# Patient Record
Sex: MALE | Race: OTHER | NOT HISPANIC OR LATINO
[De-identification: names, ages, dates, MRNs, and addresses within clinical notes are randomized per-mention and may not be internally consistent; named-entity substitution may affect disease eponyms.]

---

## 2023-04-11 PROBLEM — Z00.00 ENCOUNTER FOR PREVENTIVE HEALTH EXAMINATION: Status: ACTIVE | Noted: 2023-04-11

## 2023-04-19 ENCOUNTER — APPOINTMENT (OUTPATIENT)
Dept: CARDIOLOGY | Facility: CLINIC | Age: 43
End: 2023-04-19
Payer: COMMERCIAL

## 2023-04-19 ENCOUNTER — NON-APPOINTMENT (OUTPATIENT)
Age: 43
End: 2023-04-19

## 2023-04-19 VITALS
SYSTOLIC BLOOD PRESSURE: 116 MMHG | OXYGEN SATURATION: 97 % | HEIGHT: 68 IN | DIASTOLIC BLOOD PRESSURE: 75 MMHG | BODY MASS INDEX: 28.79 KG/M2 | WEIGHT: 190 LBS | HEART RATE: 35 BPM

## 2023-04-19 DIAGNOSIS — Z82.49 FAMILY HISTORY OF ISCHEMIC HEART DISEASE AND OTHER DISEASES OF THE CIRCULATORY SYSTEM: ICD-10-CM

## 2023-04-19 DIAGNOSIS — Z87.891 PERSONAL HISTORY OF NICOTINE DEPENDENCE: ICD-10-CM

## 2023-04-19 DIAGNOSIS — Z87.19 PERSONAL HISTORY OF OTHER DISEASES OF THE DIGESTIVE SYSTEM: ICD-10-CM

## 2023-04-19 DIAGNOSIS — F41.8 OTHER SPECIFIED ANXIETY DISORDERS: ICD-10-CM

## 2023-04-19 DIAGNOSIS — Z86.79 PERSONAL HISTORY OF OTHER DISEASES OF THE CIRCULATORY SYSTEM: ICD-10-CM

## 2023-04-19 PROCEDURE — 99495 TRANSJ CARE MGMT MOD F2F 14D: CPT

## 2023-04-19 PROCEDURE — 93000 ELECTROCARDIOGRAM COMPLETE: CPT

## 2023-04-19 PROCEDURE — 99204 OFFICE O/P NEW MOD 45 MIN: CPT | Mod: 25

## 2023-04-20 ENCOUNTER — NON-APPOINTMENT (OUTPATIENT)
Age: 43
End: 2023-04-20

## 2023-04-20 PROBLEM — Z87.891 FORMER SMOKER: Status: ACTIVE | Noted: 2023-04-19

## 2023-04-20 PROBLEM — Z87.19 HISTORY OF GASTROESOPHAGEAL REFLUX (GERD): Status: RESOLVED | Noted: 2023-04-20 | Resolved: 2023-04-20

## 2023-04-20 PROBLEM — Z87.891 HISTORY OF NICOTINE DEPENDENCE: Status: RESOLVED | Noted: 2023-04-20 | Resolved: 2023-04-20

## 2023-04-20 PROBLEM — F41.8 ANXIOUS DEPRESSION: Status: RESOLVED | Noted: 2023-04-20 | Resolved: 2023-04-20

## 2023-04-20 PROBLEM — Z86.79 HISTORY OF HYPERTENSION: Status: RESOLVED | Noted: 2023-04-20 | Resolved: 2023-04-20

## 2023-04-20 RX ORDER — RISPERIDONE 2 MG/1
2 TABLET, ORALLY DISINTEGRATING ORAL
Refills: 0 | Status: ACTIVE | COMMUNITY

## 2023-04-20 RX ORDER — ALBUTEROL SULFATE 90 UG/1
108 (90 BASE) INHALANT RESPIRATORY (INHALATION)
Refills: 0 | Status: ACTIVE | COMMUNITY

## 2023-04-20 RX ORDER — OMEPRAZOLE MAGNESIUM 20 MG/1
20 CAPSULE, DELAYED RELEASE ORAL
Refills: 0 | Status: ACTIVE | COMMUNITY

## 2023-04-20 NOTE — DISCUSSION/SUMMARY
[FreeTextEntry1] : Shortness of breath/chest pain\par The working diagnosis is dyspnea secondary to obesity deconditioning  and smoking with musculoskeletal chest pain.  The history is compelling for this diagnosis.  The history physical findings and 4/23 chest x-ray do not support a diagnosis of pulmonary venous congestion/heart failure.  The absence of electrocardiographic findings of ischemia and normal troponin levels argue against myocardial ischemia/atherosclerotic heart disease.  The normal  4/23 CTA of the chest effectively ruled out pulmonary emboli.  Noninvasive cardiac studies would be helpful for further evaluation.\par \par I have recommended the following\par a.  Continued complete cessation of smoking.\par b.  Low-salt low-fat low-cholesterol heart healthy diet.  Regular aerobic exercise and weight loss\par c.  Echocardiogram\par d.  Exercise treadmill ECG study\par \par \par \par Hypertension\par Hypertension appears to be controlled with the present medical regimen.  The dose of lisinopril may be increased if required to maintain optimal levels.  Nonpharmacological therapy, specifically diet exercise and weight loss are emphasized as major aspects of treatment.\par \par I have recommended the following\par a.  Low-fat low-cholesterol low-salt heart healthy diet.  Regular aerobic exercise and weight loss\par b.  Continue the present medical regimen\par c.  Increase lisinopril dose if required to maintain optimal levels as discussed above\par d.  Home blood pressure monitoring\par \par \par \par Hyperlipidemia\par Hyperlipidemia represents a risk factor for atherosclerotic heart disease.  The target LDL level for primary prevention is about 100.  In 4/23 the serum cholesterol level was 225  HDL 29 and triglycerides 320.  The dose of atorvastatin may be increased if required to obtain optimal levels.  Nonpharmacological therapy, specifically diet exercise and weight loss are emphasized as major aspects of treatment.\par \par I have recommended the following\par a.  Low-salt low-fat low-cholesterol heart healthy diet.  Regular aerobic exercise and weight loss.\par b.  Target LDL level to about 100 as discussed above\par c.  Continue the present medical regimen\par d.  Increase atorvastatin dose if required to obtain optimal levels\par e.  Laboratory studies including fasting lipid profile liver function tests and CPK levels 6 to 8 weeks following initiation of treatment\par f.   Screening exercise treadmill ECG study.\par \par \par Nicotine dependence\par Tyler smoked 1 to 2 packs of cigarettes daily stopping following the 4/23 hospitalization for chest pain and shortness of breath.  He presently is using a nicotine patch.  Continue complete cessation of smoking is advised.\par \par \par Obesity\par Obesity exacerbates Tyler's cardiovascular issues.  Today Mr. Skaggs is 5 feet 8 inches tall and weighs 190 pounds.  Diet exercise and weight loss are advised.\par \par \par \par \par The diagnosis, prognosis, risks, options and alternatives were explained at length to the patient.  All questions were answered.  Issues discussed included hypertension hyperlipidemia nicotine dependence alcohol use atherosclerotic heart disease noninvasive cardiac testing obesity diet exercise and weight loss.

## 2023-04-20 NOTE — REVIEW OF SYSTEMS
[Feeling Fatigued] : feeling fatigued [Chest Discomfort] : chest discomfort [Negative] : Heme/Lymph [FreeTextEntry5] : See history of present illness

## 2023-04-20 NOTE — PHYSICAL EXAM
[Normal Conjunctiva] : normal conjunctiva [Normal S1, S2] : normal S1, S2 [Clear Lung Fields] : clear lung fields [Soft] : abdomen soft [Normal Bowel Sounds] : normal bowel sounds [Normal Gait] : normal gait [No Rash] : no rash [No Focal Deficits] : no focal deficits [de-identified] : Appears overweight  in no distress lying flat [de-identified] : Normocephalic [de-identified] : No neck vein distention.  No carotid bruit [de-identified] : No murmur.  No gallop.  No diastolic sounds. [de-identified] : Full range of motion [de-identified] : No peripheral edema.  Dorsalis pedis pulse +2 bilaterally.  Feet warm and well-perfused.  No ulcerations. [de-identified] : Melindaos [de-identified] : Pleasant

## 2023-04-20 NOTE — HISTORY OF PRESENT ILLNESS
[FreeTextEntry1] : 42-year-old man\par Cardiology consultation requested because of chest pain/hypertension and dyspnea\par \par Mr. Skaggs has no known heart disease.  There is no history of myocardial infarction angina or congestive heart failure.\par \margret Scott was hospitalized 4/6/23-4/08/23 because of chest pain and shortness of breath.  There was no evidence of a myocardial infarction.  Troponin levels and electrocardiogram were unremarkable.  He was found to have significant hypertension and hyperlipidemia and was treated with antihypertensive agents and atorvastatin.  He was advised to have a cardiovascular evaluation.  (See hospital records)\par \margret Scott smoked as much is 2 packs of cigarettes daily stopping following the hospitalization.  He now uses a nicotine patch.  He had been drinking 5-6 beers daily also stopping following hospitalization.\par His mother and grandmother had myocardial infarctions.  His mother has hypertension\par \par Chest pain and dyspnea have improved following hospitalization and blood pressure levels have normalized.  However his heart rate has consistently been about 100/minute.\par \par There is no history of diabetes.\par \margret Scott presents today for cardiovascular evaluation.\par

## 2023-04-21 ENCOUNTER — APPOINTMENT (OUTPATIENT)
Dept: CARDIOLOGY | Facility: CLINIC | Age: 43
End: 2023-04-21
Payer: COMMERCIAL

## 2023-04-21 PROCEDURE — 93015 CV STRESS TEST SUPVJ I&R: CPT

## 2023-05-08 ENCOUNTER — APPOINTMENT (OUTPATIENT)
Dept: CARDIOLOGY | Facility: CLINIC | Age: 43
End: 2023-05-08

## 2023-05-10 ENCOUNTER — NON-APPOINTMENT (OUTPATIENT)
Age: 43
End: 2023-05-10

## 2023-05-10 ENCOUNTER — APPOINTMENT (OUTPATIENT)
Dept: PULMONOLOGY | Facility: CLINIC | Age: 43
End: 2023-05-10
Payer: COMMERCIAL

## 2023-05-10 VITALS
OXYGEN SATURATION: 99 % | TEMPERATURE: 96.4 F | HEIGHT: 68 IN | DIASTOLIC BLOOD PRESSURE: 80 MMHG | BODY MASS INDEX: 28.79 KG/M2 | WEIGHT: 190 LBS | SYSTOLIC BLOOD PRESSURE: 120 MMHG | HEART RATE: 146 BPM

## 2023-05-10 PROCEDURE — 94010 BREATHING CAPACITY TEST: CPT

## 2023-05-10 PROCEDURE — 99204 OFFICE O/P NEW MOD 45 MIN: CPT | Mod: 25

## 2023-05-10 NOTE — ASSESSMENT
[FreeTextEntry1] :   Patient with bullous emphysema on the CAT scan with normal spirometry.  Patient stopped smoking 1 month ago.  There is also a 1 cm right lower lobe nodule on CAT scan.  Patient requires a repeat CAT scan of the chest in 3 months and follow-up with me at that time.  Patient has been congratulated on smoking cessation.  There is no indication for bronchodilator therapy at this time.

## 2023-05-10 NOTE — HISTORY OF PRESENT ILLNESS
[FreeTextEntry1] : Follow-up recent hospitalization. [de-identified] :   Patient is a 42-year-old male smoker of 1-1/2 packs/day for 28 years.  He quit 1 month ago.  He was hospitalized from April 6 April 8 with chest pain, mild shortness of breath and palpitations.  Cardiac work-up was negative.  He underwent a CTA of the chest which revealed numerous bullous disease especially in the upper lobes.  There is also on my review a 1 cm right lower lobe nodule.  He denies cough or sputum production fever, chills or wheezing.  He has no shortness of breath and yesterday was able to walk 3 miles without difficulty.  He also stopped drinking 5 beers a day about a month ago.  Past medical history hypertension.  Medications were reviewed.  Current O2 sat 97 on room air pulse is 128 and regular.  His spirometry is completely normal with an FEV1 and FVC of 83% of predicted.

## 2023-05-10 NOTE — PHYSICAL EXAM
[No Acute Distress] : no acute distress [Well Nourished] : well nourished [Well Developed] : well developed [Well-Appearing] : well-appearing [Normal Sclera/Conjunctiva] : normal sclera/conjunctiva [PERRL] : pupils equal round and reactive to light [EOMI] : extraocular movements intact [Normal Outer Ear/Nose] : the outer ears and nose were normal in appearance [Normal Oropharynx] : the oropharynx was normal [No JVD] : no jugular venous distention [No Lymphadenopathy] : no lymphadenopathy [Supple] : supple [No Respiratory Distress] : no respiratory distress  [No Accessory Muscle Use] : no accessory muscle use [Clear to Auscultation] : lungs were clear to auscultation bilaterally [Normal Rate] : normal rate  [Regular Rhythm] : with a regular rhythm [Normal S1, S2] : normal S1 and S2 [No Murmur] : no murmur heard [No Carotid Bruits] : no carotid bruits [No Abdominal Bruit] : a ~M bruit was not heard ~T in the abdomen [No Varicosities] : no varicosities [Pedal Pulses Present] : the pedal pulses are present [No Edema] : there was no peripheral edema [No Palpable Aorta] : no palpable aorta [No Extremity Clubbing/Cyanosis] : no extremity clubbing/cyanosis [Soft] : abdomen soft [Non Tender] : non-tender [Non-distended] : non-distended [No Masses] : no abdominal mass palpated [No HSM] : no HSM [Normal Bowel Sounds] : normal bowel sounds [Normal Posterior Cervical Nodes] : no posterior cervical lymphadenopathy [Normal Anterior Cervical Nodes] : no anterior cervical lymphadenopathy [No Focal Deficits] : no focal deficits [Normal Gait] : normal gait [Normal Affect] : the affect was normal [Normal Insight/Judgement] : insight and judgment were intact [de-identified] : r neck lipoma

## 2023-05-31 ENCOUNTER — RESULT CHARGE (OUTPATIENT)
Age: 43
End: 2023-05-31

## 2023-06-01 ENCOUNTER — NON-APPOINTMENT (OUTPATIENT)
Age: 43
End: 2023-06-01

## 2023-06-01 DIAGNOSIS — I49.9 CARDIAC ARRHYTHMIA, UNSPECIFIED: ICD-10-CM

## 2023-06-02 ENCOUNTER — NON-APPOINTMENT (OUTPATIENT)
Age: 43
End: 2023-06-02

## 2023-06-02 ENCOUNTER — APPOINTMENT (OUTPATIENT)
Dept: CARDIOLOGY | Facility: CLINIC | Age: 43
End: 2023-06-02
Payer: COMMERCIAL

## 2023-06-02 PROCEDURE — 93000 ELECTROCARDIOGRAM COMPLETE: CPT | Mod: 59

## 2023-06-02 PROCEDURE — 93246 EXT ECG>7D<15D RECORDING: CPT

## 2023-06-16 PROCEDURE — 93248 EXT ECG>7D<15D REV&INTERPJ: CPT

## 2023-06-26 ENCOUNTER — NON-APPOINTMENT (OUTPATIENT)
Age: 43
End: 2023-06-26

## 2023-06-26 ENCOUNTER — APPOINTMENT (OUTPATIENT)
Dept: CARDIOLOGY | Facility: CLINIC | Age: 43
End: 2023-06-26
Payer: COMMERCIAL

## 2023-06-26 PROCEDURE — 93306 TTE W/DOPPLER COMPLETE: CPT

## 2023-06-30 ENCOUNTER — TRANSCRIPTION ENCOUNTER (OUTPATIENT)
Age: 43
End: 2023-06-30

## 2023-07-04 ENCOUNTER — NON-APPOINTMENT (OUTPATIENT)
Age: 43
End: 2023-07-04

## 2023-07-05 ENCOUNTER — NON-APPOINTMENT (OUTPATIENT)
Age: 43
End: 2023-07-05

## 2023-07-07 ENCOUNTER — NON-APPOINTMENT (OUTPATIENT)
Age: 43
End: 2023-07-07

## 2023-07-07 ENCOUNTER — APPOINTMENT (OUTPATIENT)
Dept: CARDIOLOGY | Facility: CLINIC | Age: 43
End: 2023-07-07
Payer: COMMERCIAL

## 2023-07-07 VITALS
BODY MASS INDEX: 32.28 KG/M2 | OXYGEN SATURATION: 96 % | WEIGHT: 213 LBS | HEART RATE: 108 BPM | HEIGHT: 68 IN | SYSTOLIC BLOOD PRESSURE: 144 MMHG | DIASTOLIC BLOOD PRESSURE: 82 MMHG

## 2023-07-07 DIAGNOSIS — Z87.09 PERSONAL HISTORY OF OTHER DISEASES OF THE RESPIRATORY SYSTEM: ICD-10-CM

## 2023-07-07 DIAGNOSIS — Z86.39 PERSONAL HISTORY OF OTHER ENDOCRINE, NUTRITIONAL AND METABOLIC DISEASE: ICD-10-CM

## 2023-07-07 PROCEDURE — 93246 EXT ECG>7D<15D RECORDING: CPT

## 2023-07-07 PROCEDURE — 99214 OFFICE O/P EST MOD 30 MIN: CPT | Mod: 25

## 2023-07-07 PROCEDURE — 99495 TRANSJ CARE MGMT MOD F2F 14D: CPT

## 2023-07-07 PROCEDURE — 93000 ELECTROCARDIOGRAM COMPLETE: CPT | Mod: 59

## 2023-07-09 PROBLEM — Z87.09 HISTORY OF CHRONIC OBSTRUCTIVE LUNG DISEASE: Status: RESOLVED | Noted: 2023-07-09 | Resolved: 2023-07-09

## 2023-07-09 PROBLEM — Z86.39 HISTORY OF OBESITY: Status: RESOLVED | Noted: 2023-07-09 | Resolved: 2023-07-09

## 2023-07-09 RX ORDER — AMLODIPINE BESYLATE 5 MG/1
TABLET ORAL
Refills: 0 | Status: ACTIVE | COMMUNITY

## 2023-07-09 RX ORDER — METOPROLOL TARTRATE 75 MG/1
TABLET, FILM COATED ORAL
Refills: 0 | Status: ACTIVE | COMMUNITY

## 2023-07-09 NOTE — DISCUSSION/SUMMARY
[FreeTextEntry1] : Paroxysmal supraventricular tachycardia/palpitations\par Tyler was hospitalized    6/29- 6/30/23 for palpitations.  He was found to have supraventricular tachycardia 190/minute.  The administration of beta-blockers metoprolol tartrate 25 mg twice daily help to reduce heart rate.  Initially not tolerated with symptoms of dizziness he now takes metoprolol without any adverse effects.  \par \par Comment\par The working diagnosis is paroxysmal supraventricular tachycardia (190/minute) secondary to hypertensive??  atherosclerotic heart disease exacerbated by obesity.  There has been a favorable symptomatic response to the administration of low-dose beta-blockers (metoprolol tartrate 25 mg twice daily)\par \par \par I have recommended the following\par a.  Continue the present medical regimen\par b.  Zio patch/mobile telemetry study while taking the present medical regimen\par c.  Await exercise stress test\par d.  Decrease alcohol ingestion\par e.  Electrophysiological studies dependent on the patient's clinical course\par \par \par \par \par \par Shortness of breath/chest pain\par The working diagnosis is dyspnea secondary to obesity deconditioning  and smoking with musculoskeletal chest pain.  The history is compelling for this diagnosis.  The history physical findings and   6/23  chest x-ray do not support a diagnosis of pulmonary venous congestion/heart failure.  The absence of electrocardiographic findings of ischemia and normal troponin levels argue against myocardial ischemia/atherosclerotic heart disease.  The normal  4/23 CTA of the chest effectively ruled out pulmonary emboli.  Noninvasive cardiac studies would be helpful for further evaluation.\par \par I have recommended the following\par a.  Continued complete cessation of smoking.\par b.  Low-salt low-fat low-cholesterol heart healthy diet.  Regular aerobic exercise and weight loss\par c.  Exercise treadmill ECG study\par \par \par \par Hypertension\par Hypertension appears to be controlled with the present medical regimen.  Prior treatment with lisinopril 10 mg/day has been deleted and replaced by metoprolol tartrate 25 mg twice daily.  In the setting of atrial arrhythmias beta-blocker therapy is attractive.  The dose of metoprolol may be increased and/or lisinopril reinstituted if required to maintain optimal levels.   Nonpharmacological therapy, specifically diet exercise and weight loss are emphasized as major aspects of treatment.\par \par I have recommended the following\par a.  Low-fat low-cholesterol low-salt heart healthy diet.  Regular aerobic exercise and weight loss\par b.  Continue the present medical regimen\par c.  Increase metoprolol dose and/or reinstitution of lisinopril   if required to maintain optimal levels as discussed above\par d.  Home blood pressure monitoring\par \par \par \par Hyperlipidemia\par Hyperlipidemia represents a risk factor for atherosclerotic heart disease.  The target LDL level for primary prevention is about 100.  In 4/23 the serum cholesterol level was 225  HDL 29 and triglycerides 320.  Prior treatment with atorvastatin has been discontinued .  Nonpharmacological therapy, specifically diet exercise and weight loss are emphasized as major aspects of treatment.\par \par I have recommended the following\par a.  Low-salt low-fat low-cholesterol heart healthy diet.  Regular aerobic exercise and weight loss.\par b.  Target LDL level to about 100 as discussed above\par c.  Continue the present medical regimen\par d.  Reinstitution of atorvastatin if required to obtain optimal levels\par f.   Screening exercise treadmill ECG study.\par \par \par Nicotine dependence\par Tyler smoked 1 to 2 packs of cigarettes daily stopping following the 4/23 hospitalization for chest pain and shortness of breath.  He presently is using a nicotine patch.  Continue complete cessation of smoking is advised.\par \par \par \par \par Obesity\par Obesity exacerbates Tyler's cardiovascular issues.  Today Mr. Skaggs is 5 feet 8 inches tall and weighs 213  pounds.  Diet exercise and weight loss are advised.\par \par \par \par \par The diagnosis, prognosis, risks, options and alternatives were explained at length to the patient.  All questions were answered.  Issues discussed included paroxysmal supraventricular tachycardia hypertension hyperlipidemia nicotine dependence alcohol use atherosclerotic heart disease noninvasive cardiac testing obesity diet exercise and weight loss.\par \par \par Counseling and/or coordination of care\par Time was a significant factor for this patient encounter.  Total time spent with the patient and family was 30 minutes.  Greater than 50% of the time was devoted to counseling and/or coordination of care.

## 2023-07-09 NOTE — PHYSICAL EXAM
[Normal Conjunctiva] : normal conjunctiva [Normal S1, S2] : normal S1, S2 [Clear Lung Fields] : clear lung fields [Soft] : abdomen soft [Normal Bowel Sounds] : normal bowel sounds [No Rash] : no rash [Normal Gait] : normal gait [No Focal Deficits] : no focal deficits [de-identified] : Appears overweight  in no distress lying flat [de-identified] : Normocephalic [de-identified] : No neck vein distention.  No carotid bruit [de-identified] : No murmur.  No gallop.  No diastolic sounds. [de-identified] : Full range of motion [de-identified] : No peripheral edema.  Dorsalis pedis pulse +2 bilaterally.  Feet warm and well-perfused.  No ulcerations. [de-identified] : Melindaos [de-identified] : Pleasant

## 2023-07-09 NOTE — HISTORY OF PRESENT ILLNESS
[FreeTextEntry1] : 42-year-old man\par Routine follow-up out of hospital\par \par Tyler was hospitalized   ( 6/29- 6/30/23 for palpitations.  He was found to have supraventricular tachycardia 190/minute.  The administration of beta-blockers metoprolol tartrate 25 mg twice daily help to reduce heart rate.  Initially not tolerated with symptoms of dizziness he now takes metoprolol without any adverse effects.\par Dyspnea on exertion is not progressive or severe.  He has had random chest discomfort at rest without associated diaphoresis nausea or dyspnea.\par

## 2023-07-21 PROCEDURE — 93248 EXT ECG>7D<15D REV&INTERPJ: CPT

## 2023-08-04 RX ORDER — METOPROLOL TARTRATE 25 MG/1
25 TABLET, FILM COATED ORAL TWICE DAILY
Qty: 180 | Refills: 3 | Status: ACTIVE | COMMUNITY
Start: 2023-08-04

## 2023-08-05 DIAGNOSIS — Z86.79 PERSONAL HISTORY OF OTHER DISEASES OF THE CIRCULATORY SYSTEM: ICD-10-CM

## 2023-08-11 ENCOUNTER — RESULT REVIEW (OUTPATIENT)
Age: 43
End: 2023-08-11

## 2023-08-21 ENCOUNTER — APPOINTMENT (OUTPATIENT)
Dept: PULMONOLOGY | Facility: CLINIC | Age: 43
End: 2023-08-21
Payer: COMMERCIAL

## 2023-08-21 VITALS
BODY MASS INDEX: 32.28 KG/M2 | OXYGEN SATURATION: 98 % | SYSTOLIC BLOOD PRESSURE: 130 MMHG | HEIGHT: 68 IN | HEART RATE: 105 BPM | DIASTOLIC BLOOD PRESSURE: 90 MMHG | WEIGHT: 213 LBS

## 2023-08-21 DIAGNOSIS — J43.9 EMPHYSEMA, UNSPECIFIED: ICD-10-CM

## 2023-08-21 DIAGNOSIS — R91.1 SOLITARY PULMONARY NODULE: ICD-10-CM

## 2023-08-21 PROCEDURE — 99213 OFFICE O/P EST LOW 20 MIN: CPT

## 2023-08-21 NOTE — REASON FOR VISIT
[Follow-Up] : a follow-up visit [COPD] : COPD [Pulmonary Nodules] : pulmonary nodules [Nicotine Dependence] : nicotine dependence

## 2023-08-21 NOTE — HISTORY OF PRESENT ILLNESS
[TextBox_4] : Patient returns for follow-up on repeat CAT scan for follow-up of an 8 mm right lower lobe nodule.  Patient with no bullous emphysema on CAT scan.  He continues not to smoke.  He feels generally well but is eating for the past 4 months.  His breathing is fine with no cough, shortness of breath or wheezing.  His CAT scan was reviewed by me personally on 8–11.  The 8 mm nodule is unchanged compared with 4–6 2023.  Bullous emphysematous changes are unchanged.

## 2023-08-21 NOTE — ASSESSMENT
[FreeTextEntry1] : 8 mm pulmonary nodule in the right lower lobe is unchanged.  Patient remains without smoking over the past 4 months.  He is doing quite well with no respiratory complaints.  His CAT scan shows bullous emphysematous changes.  He is to continue off bronchodilators and monitor respiratory status.  He will have a repeat CAT scan of the chest in 6 months for follow-up with the 8 mm right lower lobe nodule.

## 2023-08-22 ENCOUNTER — APPOINTMENT (OUTPATIENT)
Dept: CARDIOLOGY | Facility: CLINIC | Age: 43
End: 2023-08-22

## 2023-08-29 ENCOUNTER — APPOINTMENT (OUTPATIENT)
Dept: CARDIOLOGY | Facility: CLINIC | Age: 43
End: 2023-08-29
Payer: COMMERCIAL

## 2023-08-29 VITALS
BODY MASS INDEX: 32.28 KG/M2 | OXYGEN SATURATION: 98 % | HEIGHT: 68 IN | SYSTOLIC BLOOD PRESSURE: 147 MMHG | DIASTOLIC BLOOD PRESSURE: 84 MMHG | HEART RATE: 90 BPM | WEIGHT: 213 LBS

## 2023-08-29 DIAGNOSIS — R06.02 SHORTNESS OF BREATH: ICD-10-CM

## 2023-08-29 DIAGNOSIS — E66.3 OVERWEIGHT: ICD-10-CM

## 2023-08-29 DIAGNOSIS — R00.2 PALPITATIONS: ICD-10-CM

## 2023-08-29 PROCEDURE — 99214 OFFICE O/P EST MOD 30 MIN: CPT | Mod: 25

## 2023-08-29 PROCEDURE — 93000 ELECTROCARDIOGRAM COMPLETE: CPT | Mod: 59

## 2023-09-03 ENCOUNTER — NON-APPOINTMENT (OUTPATIENT)
Age: 43
End: 2023-09-03

## 2023-09-03 PROBLEM — E66.3 OVERWEIGHT: Status: ACTIVE | Noted: 2023-04-20

## 2023-09-03 PROBLEM — R00.2 PALPITATIONS: Status: ACTIVE | Noted: 2023-06-01

## 2023-09-03 PROBLEM — R06.02 SHORTNESS OF BREATH: Status: ACTIVE | Noted: 2023-04-19

## 2023-09-03 NOTE — DISCUSSION/SUMMARY
[FreeTextEntry1] : Paroxysmal supraventricular tachycardia/palpitations Tyler was hospitalized    6/29- 6/30/23 for palpitations.  He was found to have supraventricular tachycardia 190/minute.  The administration of beta-blockers metoprolol tartrate 25 mg twice daily helped  to reduce heart rate.  Initially not tolerated with symptoms of dizziness he now takes metoprolol without any adverse effects.    Comment The working diagnosis is paroxysmal supraventricular tachycardia (190/minute) secondary to hypertensive??  atherosclerotic heart disease exacerbated by obesity.  There has been a favorable symptomatic response to the administration of low-dose beta-blockers (metoprolol tartrate 25 mg twice daily)  A  7/23 2-week Zio patch mobile telemetry study revealed sinus rhythm /minute.  The average heart rate was 88/minute.  No arrhythmia was seen.  No symptoms were recorded.   I have recommended the following a.  Continue the present medical regimen b Continue the present medical regimen c     Electrophysiological studies dependent on the patient's clinical course      Shortness of breath/chest pain The working diagnosis is dyspnea secondary to obesity deconditioning  and smoking with musculoskeletal chest pain.  The history is compelling for this diagnosis.  The history physical findings and   6/23  chest x-ray do not support a diagnosis of pulmonary venous congestion/heart failure.  The absence of electrocardiographic findings of ischemia  normal troponin levels  and  normal 4/23 exercise treadmill ECG study third stage Venu protocol   argue against myocardial ischemia/atherosclerotic heart disease.  The normal  4/23 CTA of the chest effectively ruled out pulmonary emboli.    I have recommended the following a.  Continued complete cessation of smoking. b.  Low-salt low-fat low-cholesterol heart healthy diet.  Regular aerobic exercise and weight loss c. No further cardiac testing for this problem at this time     Hypertension Hypertension appears to be controlled with the present medical regimen.  Prior treatment with lisinopril 10 mg/day has been deleted and replaced by metoprolol tartrate 25 mg twice daily. and amlodipine  5  mg/day  In the setting of atrial arrhythmias beta-blocker therapy is attractive.  The dose of metoprolol may be increased and/or lisinopril reinstituted if required to maintain optimal levels.   Nonpharmacological therapy, specifically diet exercise and weight loss are emphasized as major aspects of treatment.  I have recommended the following a.  Low-fat low-cholesterol low-salt heart healthy diet.  Regular aerobic exercise and weight loss b.  Continue the present medical regimen c.  Increase metoprolol dose and/or reinstitution of lisinopril   if required to maintain optimal levels as discussed above d.  Home blood pressure monitoring    Hyperlipidemia Hyperlipidemia represents a risk factor for atherosclerotic heart disease.  The target LDL level for primary prevention is about 100.  In 4/23 the serum cholesterol level was 225  HDL 29 and triglycerides 320.  Prior treatment with atorvastatin has been discontinued .  Nonpharmacological therapy, specifically diet exercise and weight loss are emphasized as major aspects of treatment.  I have recommended the following a.  Low-salt low-fat low-cholesterol heart healthy diet.  Regular aerobic exercise and weight loss. b.  Target LDL level to about 100 as discussed above c.  Continue the present medical regimen d.   Reinstitution of HMG Co. a reductase inhibitor therapy dependent on the patient's clinical course and follow-up lipid levels.   Nicotine dependence Tyler smoked 1 to 2 packs of cigarettes daily stopping following the 4/23 hospitalization for chest pain and shortness of breath.  .  Continued  complete cessation of smoking is advised.     Obesity Obesity exacerbates Tyler's cardiovascular issues.  Today Mr. Skaggs is 5 feet 8 inches tall and weighs 213  pounds.  Diet exercise and weight loss are advised.     The diagnosis, prognosis, risks, options and alternatives were explained at length to the patient.  All questions were answered.  Issues discussed included paroxysmal supraventricular tachycardia hypertension hyperlipidemia nicotine dependence alcohol use atherosclerotic heart disease noninvasive cardiac testing obesity diet exercise and weight loss.   Counseling and/or coordination of care Time was a significant factor for this patient encounter.  Total time spent with the patient was 30 minutes.  Greater than 50% of the time was devoted to counseling and/or coordination of care.

## 2023-09-03 NOTE — HISTORY OF PRESENT ILLNESS
[FreeTextEntry1] : 43-year-old man Routine follow-up for paroxysmal supraventricular tachycardia palpitations nicotine dependence hyperlipidemia   "I feel much better."  Heart rates have decreased significantly.  No sustained palpitations.  No shortness of breath at rest.  No chest pain.  No syncope.

## 2023-09-03 NOTE — PHYSICAL EXAM
[Normal Conjunctiva] : normal conjunctiva [Normal S1, S2] : normal S1, S2 [Clear Lung Fields] : clear lung fields [Soft] : abdomen soft [Normal Bowel Sounds] : normal bowel sounds [Normal Gait] : normal gait [No Rash] : no rash [No Focal Deficits] : no focal deficits [de-identified] : Appears overweight  in no distress lying flat [de-identified] : Normocephalic [de-identified] : No neck vein distention.  No carotid bruit [de-identified] : No murmur.  No gallop.  No diastolic sounds. [de-identified] : Full range of motion [de-identified] : No peripheral edema.  Dorsalis pedis pulse +2 bilaterally.  Feet warm and well-perfused.  No ulcerations. [de-identified] : Pleasant [de-identified] : Melindaos

## 2023-10-18 ENCOUNTER — NON-APPOINTMENT (OUTPATIENT)
Age: 43
End: 2023-10-18

## 2023-10-18 ENCOUNTER — APPOINTMENT (OUTPATIENT)
Dept: CARDIOLOGY | Facility: CLINIC | Age: 43
End: 2023-10-18
Payer: COMMERCIAL

## 2023-10-18 DIAGNOSIS — E78.5 HYPERLIPIDEMIA, UNSPECIFIED: ICD-10-CM

## 2023-10-18 DIAGNOSIS — I47.10 SUPRAVENTRICULAR TACHYCARDIA, UNSPECIFIED: ICD-10-CM

## 2023-10-18 DIAGNOSIS — I10 ESSENTIAL (PRIMARY) HYPERTENSION: ICD-10-CM

## 2023-10-18 DIAGNOSIS — F17.200 NICOTINE DEPENDENCE, UNSPECIFIED, UNCOMPLICATED: ICD-10-CM

## 2023-10-18 DIAGNOSIS — E66.9 OBESITY, UNSPECIFIED: ICD-10-CM

## 2023-10-18 DIAGNOSIS — R07.9 CHEST PAIN, UNSPECIFIED: ICD-10-CM

## 2023-10-18 DIAGNOSIS — Z86.39 PERSONAL HISTORY OF OTHER ENDOCRINE, NUTRITIONAL AND METABOLIC DISEASE: ICD-10-CM

## 2023-10-18 PROCEDURE — 93000 ELECTROCARDIOGRAM COMPLETE: CPT

## 2023-10-18 PROCEDURE — 99214 OFFICE O/P EST MOD 30 MIN: CPT | Mod: 25

## 2023-10-22 PROBLEM — E66.9 OBESITY: Status: ACTIVE | Noted: 2023-07-09

## 2023-10-22 PROBLEM — I47.10 PAROXYSMAL SVT (SUPRAVENTRICULAR TACHYCARDIA): Status: ACTIVE | Noted: 2023-07-03

## 2023-10-22 PROBLEM — I10 HYPERTENSION: Status: ACTIVE | Noted: 2023-04-20

## 2023-10-22 PROBLEM — R07.9 CHEST PAIN: Status: ACTIVE | Noted: 2023-04-19

## 2023-10-22 PROBLEM — Z86.39 HISTORY OF HYPERLIPIDEMIA: Status: RESOLVED | Noted: 2023-04-20 | Resolved: 2023-10-22

## 2023-10-22 PROBLEM — F17.200 NICOTINE DEPENDENCE: Status: ACTIVE | Noted: 2023-04-20

## 2023-10-22 PROBLEM — E78.5 HYPERLIPIDEMIA: Status: ACTIVE | Noted: 2023-04-20

## 2024-02-23 ENCOUNTER — TRANSCRIPTION ENCOUNTER (OUTPATIENT)
Age: 44
End: 2024-02-23

## 2024-02-23 ENCOUNTER — RESULT REVIEW (OUTPATIENT)
Age: 44
End: 2024-02-23

## 2024-03-12 ENCOUNTER — NON-APPOINTMENT (OUTPATIENT)
Age: 44
End: 2024-03-12

## 2024-03-29 ENCOUNTER — APPOINTMENT (OUTPATIENT)
Dept: CARDIOLOGY | Facility: CLINIC | Age: 44
End: 2024-03-29

## 2024-08-28 ENCOUNTER — APPOINTMENT (OUTPATIENT)
Dept: CARDIOLOGY | Facility: CLINIC | Age: 44
End: 2024-08-28

## 2024-10-09 ENCOUNTER — APPOINTMENT (OUTPATIENT)
Dept: CARDIOLOGY | Facility: CLINIC | Age: 44
End: 2024-10-09

## 2025-07-29 ENCOUNTER — RX RENEWAL (OUTPATIENT)
Age: 45
End: 2025-07-29